# Patient Record
Sex: MALE | Race: ASIAN | NOT HISPANIC OR LATINO | Employment: FULL TIME | URBAN - METROPOLITAN AREA
[De-identification: names, ages, dates, MRNs, and addresses within clinical notes are randomized per-mention and may not be internally consistent; named-entity substitution may affect disease eponyms.]

---

## 2018-05-17 ENCOUNTER — HOSPITAL ENCOUNTER (OUTPATIENT)
Facility: MEDICAL CENTER | Age: 28
End: 2018-05-17
Attending: PHYSICIAN ASSISTANT
Payer: COMMERCIAL

## 2018-05-17 ENCOUNTER — OFFICE VISIT (OUTPATIENT)
Dept: URGENT CARE | Facility: PHYSICIAN GROUP | Age: 28
End: 2018-05-17
Payer: COMMERCIAL

## 2018-05-17 VITALS
SYSTOLIC BLOOD PRESSURE: 108 MMHG | BODY MASS INDEX: 20.28 KG/M2 | OXYGEN SATURATION: 96 % | TEMPERATURE: 98.3 F | DIASTOLIC BLOOD PRESSURE: 78 MMHG | HEART RATE: 97 BPM | HEIGHT: 73 IN | WEIGHT: 153 LBS

## 2018-05-17 DIAGNOSIS — B08.5 HERPANGINA: ICD-10-CM

## 2018-05-17 DIAGNOSIS — R50.9 FEVER, UNSPECIFIED FEVER CAUSE: ICD-10-CM

## 2018-05-17 DIAGNOSIS — K13.0 SORE OF LIP: ICD-10-CM

## 2018-05-17 LAB
HETEROPH AB SER QL LA: NEGATIVE
INT CON NEG: NORMAL
INT CON NEG: NORMAL
INT CON POS: NORMAL
INT CON POS: NORMAL
S PYO AG THROAT QL: NEGATIVE

## 2018-05-17 PROCEDURE — 87529 HSV DNA AMP PROBE: CPT

## 2018-05-17 PROCEDURE — 99204 OFFICE O/P NEW MOD 45 MIN: CPT | Performed by: PHYSICIAN ASSISTANT

## 2018-05-17 PROCEDURE — 99000 SPECIMEN HANDLING OFFICE-LAB: CPT | Performed by: PHYSICIAN ASSISTANT

## 2018-05-17 PROCEDURE — 87880 STREP A ASSAY W/OPTIC: CPT | Performed by: PHYSICIAN ASSISTANT

## 2018-05-17 PROCEDURE — 86308 HETEROPHILE ANTIBODY SCREEN: CPT | Performed by: PHYSICIAN ASSISTANT

## 2018-05-17 RX ORDER — DEXAMETHASONE SODIUM PHOSPHATE 10 MG/ML
10 INJECTION INTRAMUSCULAR; INTRAVENOUS ONCE
Status: COMPLETED | OUTPATIENT
Start: 2018-05-17 | End: 2018-05-17

## 2018-05-17 RX ORDER — VALACYCLOVIR HYDROCHLORIDE 1 G/1
1000 TABLET, FILM COATED ORAL 2 TIMES DAILY
Qty: 10 TAB | Refills: 0 | Status: SHIPPED | OUTPATIENT
Start: 2018-05-17

## 2018-05-17 RX ADMIN — DEXAMETHASONE SODIUM PHOSPHATE 10 MG: 10 INJECTION INTRAMUSCULAR; INTRAVENOUS at 17:57

## 2018-05-17 NOTE — LETTER
May 17, 2018         Patient: Baldev Eldridge   YOB: 1990   Date of Visit: 5/17/2018           To Whom it May Concern:    Baldev Eldridge was seen in my clinic on 5/17/2018. He is to be out of work through Sunday 05/20/18 to recover     If you have any questions or concerns, please don't hesitate to call.        Sincerely,           Gricelda Chris P.A.-C.  Electronically Signed

## 2018-05-17 NOTE — PROGRESS NOTES
"Chief Complaint   Patient presents with   • Fever     cough, blisters on lips and mouth, sore throat        HISTORY OF PRESENT ILLNESS: Patient is a 28 y.o. male who presents today for about 1 week of waxing and waning fevers and in last few days he has blisters/sores on lips as well has inside of mouth and throat.  Wife here with patient notes he has lost weight and does not want to eat secondary to the pain.  He has no hx of cold sores. Wife notes his fever responds very well to the Ibuprofen ut his fever will be back up to a measured 101-103 within 4 hours usually.    He has had a cough on and off for last month as well but does not feel the cough is necessarily related to current symptoms as this has not been worse. No nausea, vomiting or abdominal pain.  No headaches or dizziness.      He has been on Zithromax and Promethazine sent in by physician in the family however not getting better.     There are no active problems to display for this patient.      Allergies:Patient has no known allergies.    No current [facility]-ordered outpatient prescriptions on file.     No current [facility]-ordered facility-administered medications on file.        No past medical history on file.    Social History   Substance Use Topics   • Smoking status: Never Smoker   • Smokeless tobacco: Never Used   • Alcohol use No       No family status information on file.   No family history on file. No pertinent FH    ROS:  Review of Systems   Constitutional: SEE HPI   HENT: SEE HPI  Eyes: Negative for blurred vision.   Respiratory: SEE HPI    Cardiovascular: Negative for chest pain, palpitations, orthopnea and leg swelling.   Gastrointestinal: Negative for heartburn, nausea, vomiting and abdominal pain.   All other systems reviewed and are negative.       Exam:  Blood pressure 108/78, pulse 97, temperature 36.8 °C (98.3 °F), height 1.842 m (6' 0.5\"), weight 69.4 kg (153 lb), SpO2 96 %.  General:  Well nourished, well developed male in NAD  Eyes: " PERRLA, EOM within normal limits, no conjunctival injection, no scleral icterus, visual fields and acuity grossly intact.  Ears: Normal shape and symmetry, no tenderness, no discharge. External canals are without any significant edema or erythema. Tympanic membranes are without any inflammation, no effusion. Gross auditory acuity is intact  Nose: Symmetrical, sinuses without tenderness, clear rhinorrhea.   Mouth: reasonable hygiene, moderate diffuse erythema without exudates, bilateral mild tonsillar enlargement.  Aphthous appearing ulcerations throughout posterior throat and some on tongue, soft palate.  Upper and lower lips with crusted over vesicular appearing lesions x 2.   Neck: no masses, range of motion within normal limits, no tracheal deviation. No lymphadenopathy  Pulmonary: Normal respiratory effort, no wheezes, crackles, or rhonchi.  Cardiovascular: regular rate and rhythm without murmurs, rubs, or gallops.  Abdomen: Soft, nontender, nondistended. Normal bowel sounds. No hepatosplenomegaly or masses, or hernias. No rebound or guarding.  Skin: No visible rashes or lesion. Warm, pink, dry.   Extremities: no clubbing, cyanosis, or edema.  Neuro: A&O x 3. Speech normal/clear.  Normal gait.         Assessment/Plan:  1. Herpangina  POCT Mononucleosis (mono)    POCT Rapid Strep A    dexamethasone (DECADRON) injection (check route below) 10 mg    mag hydrox-al hydrox-simeth-diphenhydrAMINE-lidocaine viscous 2%    CANCELED: HSV 1/2 DETECT+DIFF BY PCR   2. Fever, unspecified fever cause     3. Sore of lip  valacyclovir (VALTREX) 1 GM Tab    HSV 1/2 DETECT+DIFF BY PCR       -mono and strep negative.   -patient is already on Zithromax with no improvement in throat symptoms.  Lungs CTA.   -presentation of throat more consistent with viral etiology.   -HSV swab taken as lip sores are consistent with HSV and likely secondary in response to fevers  -BMX given.   Oral decadron given  -will follow viral culture.    -work  note provided.   -RTC precautions discussed such as worsening sore throat despite tx,  worsening fevers, increased difficulty swallowing or breathing, drooling, etc.     Supportive care, differential diagnoses, and indications for immediate follow-up discussed with patient.   Pathogenesis of diagnosis discussed including typical length and natural progression.   Instructed to return to clinic or nearest emergency department for any change in condition, further concerns, or worsening of symptoms.  Patient states understanding of the plan of care and discharge instructions.      Gricelda Chris P.A.-C.

## 2018-05-18 LAB
HSV1+2 DNA SPEC QL NAA+PROBE: ABNORMAL
SIGNIFICANT IND 70042: ABNORMAL
SITE SITE: ABNORMAL
SOURCE SOURCE: ABNORMAL